# Patient Record
Sex: MALE | Race: WHITE | NOT HISPANIC OR LATINO | Employment: FULL TIME | URBAN - METROPOLITAN AREA
[De-identification: names, ages, dates, MRNs, and addresses within clinical notes are randomized per-mention and may not be internally consistent; named-entity substitution may affect disease eponyms.]

---

## 2020-08-04 ENCOUNTER — RECORDS - HEALTHEAST (OUTPATIENT)
Dept: LAB | Facility: CLINIC | Age: 30
End: 2020-08-04

## 2020-08-04 LAB
ALBUMIN SERPL-MCNC: 4.1 G/DL (ref 3.5–5)
ALP SERPL-CCNC: 98 U/L (ref 45–120)
ALT SERPL W P-5'-P-CCNC: 54 U/L (ref 0–45)
ANION GAP SERPL CALCULATED.3IONS-SCNC: 11 MMOL/L (ref 5–18)
AST SERPL W P-5'-P-CCNC: 24 U/L (ref 0–40)
BILIRUB SERPL-MCNC: 0.4 MG/DL (ref 0–1)
BUN SERPL-MCNC: 14 MG/DL (ref 8–22)
CALCIUM SERPL-MCNC: 9.7 MG/DL (ref 8.5–10.5)
CHLORIDE BLD-SCNC: 104 MMOL/L (ref 98–107)
CO2 SERPL-SCNC: 26 MMOL/L (ref 22–31)
CREAT SERPL-MCNC: 0.98 MG/DL (ref 0.7–1.3)
ERYTHROCYTE [SEDIMENTATION RATE] IN BLOOD BY WESTERGREN METHOD: 15 MM/HR (ref 0–15)
GFR SERPL CREATININE-BSD FRML MDRD: >60 ML/MIN/1.73M2
GLUCOSE BLD-MCNC: 122 MG/DL (ref 70–125)
IRON SATN MFR SERPL: 3 % (ref 20–50)
IRON SERPL-MCNC: 13 UG/DL (ref 42–175)
LIPASE SERPL-CCNC: 17 U/L (ref 0–52)
POTASSIUM BLD-SCNC: 4.4 MMOL/L (ref 3.5–5)
PROT SERPL-MCNC: 7.5 G/DL (ref 6–8)
SODIUM SERPL-SCNC: 141 MMOL/L (ref 136–145)
TIBC SERPL-MCNC: 463 UG/DL (ref 313–563)
TRANSFERRIN SERPL-MCNC: 370 MG/DL (ref 212–360)
VIT B12 SERPL-MCNC: 445 PG/ML (ref 213–816)

## 2021-05-28 ENCOUNTER — RECORDS - HEALTHEAST (OUTPATIENT)
Dept: ADMINISTRATIVE | Facility: CLINIC | Age: 31
End: 2021-05-28

## 2021-05-30 ENCOUNTER — RECORDS - HEALTHEAST (OUTPATIENT)
Dept: ADMINISTRATIVE | Facility: CLINIC | Age: 31
End: 2021-05-30

## 2022-01-16 ENCOUNTER — HOSPITAL ENCOUNTER (EMERGENCY)
Facility: CLINIC | Age: 32
Discharge: HOME | End: 2022-01-17
Attending: EMERGENCY MEDICINE | Admitting: EMERGENCY MEDICINE
Payer: COMMERCIAL

## 2022-01-16 ENCOUNTER — APPOINTMENT (OUTPATIENT)
Dept: GENERAL RADIOLOGY | Facility: CLINIC | Age: 32
End: 2022-01-16
Attending: EMERGENCY MEDICINE
Payer: COMMERCIAL

## 2022-01-16 DIAGNOSIS — K52.9 GASTROENTERITIS: ICD-10-CM

## 2022-01-16 LAB
ALBUMIN SERPL-MCNC: 3.5 G/DL (ref 3.4–5)
ALP SERPL-CCNC: 93 U/L (ref 40–150)
ALT SERPL W P-5'-P-CCNC: 45 U/L (ref 0–70)
ANION GAP SERPL CALCULATED.3IONS-SCNC: 7 MMOL/L (ref 3–14)
AST SERPL W P-5'-P-CCNC: 22 U/L (ref 0–45)
BASOPHILS # BLD AUTO: 0 10E3/UL (ref 0–0.2)
BASOPHILS NFR BLD AUTO: 0 %
BILIRUB SERPL-MCNC: 0.8 MG/DL (ref 0.2–1.3)
BUN SERPL-MCNC: 18 MG/DL (ref 7–30)
CALCIUM SERPL-MCNC: 9 MG/DL (ref 8.5–10.1)
CHLORIDE BLD-SCNC: 102 MMOL/L (ref 94–109)
CO2 SERPL-SCNC: 26 MMOL/L (ref 20–32)
CREAT SERPL-MCNC: 1.04 MG/DL (ref 0.66–1.25)
EOSINOPHIL # BLD AUTO: 0.1 10E3/UL (ref 0–0.7)
EOSINOPHIL NFR BLD AUTO: 1 %
ERYTHROCYTE [DISTWIDTH] IN BLOOD BY AUTOMATED COUNT: 14 % (ref 10–15)
GFR SERPL CREATININE-BSD FRML MDRD: >90 ML/MIN/1.73M2
GLUCOSE BLD-MCNC: 113 MG/DL (ref 70–99)
HCT VFR BLD AUTO: 43.9 % (ref 40–53)
HGB BLD-MCNC: 14.8 G/DL (ref 13.3–17.7)
HOLD SPECIMEN: NORMAL
HOLD SPECIMEN: NORMAL
IMM GRANULOCYTES # BLD: 0 10E3/UL
IMM GRANULOCYTES NFR BLD: 0 %
LACTATE SERPL-SCNC: 1.5 MMOL/L (ref 0.7–2)
LIPASE SERPL-CCNC: 39 U/L (ref 73–393)
LYMPHOCYTES # BLD AUTO: 1.5 10E3/UL (ref 0.8–5.3)
LYMPHOCYTES NFR BLD AUTO: 14 %
MCH RBC QN AUTO: 29.3 PG (ref 26.5–33)
MCHC RBC AUTO-ENTMCNC: 33.7 G/DL (ref 31.5–36.5)
MCV RBC AUTO: 87 FL (ref 78–100)
MONOCYTES # BLD AUTO: 0.5 10E3/UL (ref 0–1.3)
MONOCYTES NFR BLD AUTO: 5 %
NEUTROPHILS # BLD AUTO: 8.2 10E3/UL (ref 1.6–8.3)
NEUTROPHILS NFR BLD AUTO: 80 %
NRBC # BLD AUTO: 0 10E3/UL
NRBC BLD AUTO-RTO: 0 /100
PLATELET # BLD AUTO: 290 10E3/UL (ref 150–450)
POTASSIUM BLD-SCNC: 3.4 MMOL/L (ref 3.4–5.3)
PROT SERPL-MCNC: 7.3 G/DL (ref 6.8–8.8)
RBC # BLD AUTO: 5.05 10E6/UL (ref 4.4–5.9)
SODIUM SERPL-SCNC: 135 MMOL/L (ref 133–144)
WBC # BLD AUTO: 10.3 10E3/UL (ref 4–11)

## 2022-01-16 PROCEDURE — 82040 ASSAY OF SERUM ALBUMIN: CPT | Performed by: EMERGENCY MEDICINE

## 2022-01-16 PROCEDURE — 258N000003 HC RX IP 258 OP 636: Performed by: EMERGENCY MEDICINE

## 2022-01-16 PROCEDURE — 36415 COLL VENOUS BLD VENIPUNCTURE: CPT | Performed by: EMERGENCY MEDICINE

## 2022-01-16 PROCEDURE — 80053 COMPREHEN METABOLIC PANEL: CPT | Performed by: EMERGENCY MEDICINE

## 2022-01-16 PROCEDURE — 74019 RADEX ABDOMEN 2 VIEWS: CPT | Mod: 26 | Performed by: RADIOLOGY

## 2022-01-16 PROCEDURE — 250N000013 HC RX MED GY IP 250 OP 250 PS 637: Performed by: EMERGENCY MEDICINE

## 2022-01-16 PROCEDURE — 99284 EMERGENCY DEPT VISIT MOD MDM: CPT | Mod: 25 | Performed by: EMERGENCY MEDICINE

## 2022-01-16 PROCEDURE — 83690 ASSAY OF LIPASE: CPT | Performed by: EMERGENCY MEDICINE

## 2022-01-16 PROCEDURE — 96374 THER/PROPH/DIAG INJ IV PUSH: CPT | Performed by: EMERGENCY MEDICINE

## 2022-01-16 PROCEDURE — 74019 RADEX ABDOMEN 2 VIEWS: CPT

## 2022-01-16 PROCEDURE — 85025 COMPLETE CBC W/AUTO DIFF WBC: CPT | Performed by: EMERGENCY MEDICINE

## 2022-01-16 PROCEDURE — 83605 ASSAY OF LACTIC ACID: CPT | Performed by: EMERGENCY MEDICINE

## 2022-01-16 PROCEDURE — 250N000011 HC RX IP 250 OP 636: Performed by: EMERGENCY MEDICINE

## 2022-01-16 PROCEDURE — 96361 HYDRATE IV INFUSION ADD-ON: CPT | Performed by: EMERGENCY MEDICINE

## 2022-01-16 PROCEDURE — 99284 EMERGENCY DEPT VISIT MOD MDM: CPT | Performed by: EMERGENCY MEDICINE

## 2022-01-16 RX ORDER — ONDANSETRON 2 MG/ML
4 INJECTION INTRAMUSCULAR; INTRAVENOUS ONCE
Status: COMPLETED | OUTPATIENT
Start: 2022-01-16 | End: 2022-01-16

## 2022-01-16 RX ORDER — SODIUM CHLORIDE, SODIUM LACTATE, POTASSIUM CHLORIDE, CALCIUM CHLORIDE 600; 310; 30; 20 MG/100ML; MG/100ML; MG/100ML; MG/100ML
1000 INJECTION, SOLUTION INTRAVENOUS CONTINUOUS
Status: DISCONTINUED | OUTPATIENT
Start: 2022-01-16 | End: 2022-01-17 | Stop reason: HOSPADM

## 2022-01-16 RX ORDER — LOPERAMIDE HCL 2 MG
2 CAPSULE ORAL ONCE
Status: COMPLETED | OUTPATIENT
Start: 2022-01-16 | End: 2022-01-16

## 2022-01-16 RX ORDER — CETIRIZINE HYDROCHLORIDE 10 MG/1
10 TABLET ORAL DAILY
COMMUNITY

## 2022-01-16 RX ADMIN — SODIUM CHLORIDE, POTASSIUM CHLORIDE, SODIUM LACTATE AND CALCIUM CHLORIDE 1000 ML: 600; 310; 30; 20 INJECTION, SOLUTION INTRAVENOUS at 20:31

## 2022-01-16 RX ADMIN — LOPERAMIDE HYDROCHLORIDE 2 MG: 2 CAPSULE ORAL at 23:14

## 2022-01-16 RX ADMIN — SODIUM CHLORIDE, POTASSIUM CHLORIDE, SODIUM LACTATE AND CALCIUM CHLORIDE 1000 ML: 600; 310; 30; 20 INJECTION, SOLUTION INTRAVENOUS at 23:15

## 2022-01-16 RX ADMIN — ONDANSETRON 4 MG: 2 INJECTION INTRAMUSCULAR; INTRAVENOUS at 20:34

## 2022-01-16 ASSESSMENT — ENCOUNTER SYMPTOMS
VOMITING: 1
DIARRHEA: 1
BLOOD IN STOOL: 0
NAUSEA: 1
ABDOMINAL PAIN: 0

## 2022-01-16 ASSESSMENT — MIFFLIN-ST. JEOR: SCORE: 1836.25

## 2022-01-17 VITALS
SYSTOLIC BLOOD PRESSURE: 102 MMHG | TEMPERATURE: 98.8 F | HEART RATE: 107 BPM | BODY MASS INDEX: 27.77 KG/M2 | WEIGHT: 194 LBS | HEIGHT: 70 IN | DIASTOLIC BLOOD PRESSURE: 72 MMHG | OXYGEN SATURATION: 98 %

## 2022-01-17 RX ORDER — LOPERAMIDE HYDROCHLORIDE 2 MG/1
2 TABLET ORAL 4 TIMES DAILY PRN
Qty: 30 TABLET | Refills: 0 | Status: SHIPPED | OUTPATIENT
Start: 2022-01-17

## 2022-01-17 RX ORDER — ONDANSETRON 4 MG/1
4 TABLET, ORALLY DISINTEGRATING ORAL EVERY 8 HOURS PRN
Qty: 10 TABLET | Refills: 0 | Status: SHIPPED | OUTPATIENT
Start: 2022-01-17 | End: 2022-01-20

## 2022-01-17 NOTE — ED PROVIDER NOTES
"      [place] EMERGENCY DEPARTMENT ([facility])  1/16/22 ED11      History     Chief Complaint   Patient presents with     Nausea, Vomiting, & Diarrhea     The history is provided by the patient and medical records.     Amarjit Fry is a 31 year old male with a past medical history of ulcerative colitis, DVT, PE on Eliquis, s/p coloproctectomy with ileo J-pouch who presents to the ED for evaluation of nausea, vomiting, and diarrhea since 9 PM last night.  Patient states that at 9 PM he started to feel \"off\".  He states that he had diarrhea and threw up around 11-12 and then threw up 3-4 or more times till 4 AM. He was given Gatorade but that was thrown back up.  Today he said he had half a banana and half of Gatorade.  He has been trying to eat small amounts but says that his stomach tightens up and gets painful.  He endorses having \"hot flashes\", not sure if he is feverish.  Denies hematemesis or blood in stool.  He endorses having a feeling of pressure in the abdomen but no pain.  He is not sure if it feels more distended.  He states that he has burped and endorses \"liquidy, wispy, and green\" diarrhea.  He states that no one else at home has been sick.  He states his mother had the same lunch as he did.  Denies any food allergies or intolerances that he knows of.  States he is still taking his blood thinners and denies any other meds except Zyrtec.  His mother states that he has had a blockage after surgery before which caused his potassium to be low.  He states that he had 3 surgeries, 1 to remove his colon, the second to create a J-pouch, and the third to connect it.    Past Medical History  Past Medical History:   Diagnosis Date     DVT (deep venous thrombosis) (H)      Pulmonary emboli (H)      Past Surgical History:   Procedure Laterality Date     COLON SURGERY       COLOPROCTECTOMY W/ ILEO J POUCH       apixaban ANTICOAGULANT (ELIQUIS) 2.5 MG tablet  cetirizine (ZYRTEC) 10 MG tablet  loperamide " "(IMODIUM A-D) 2 MG tablet  ondansetron (ZOFRAN ODT) 4 MG ODT tab      No Known Allergies  Family History  No family history on file.  Social History   Social History     Tobacco Use     Smoking status: Never Smoker     Smokeless tobacco: Not on file   Substance Use Topics     Alcohol use: Not on file     Drug use: Not on file      Past medical history, past surgical history, medications, allergies, family history, and social history were reviewed with the patient. No additional pertinent items.       Review of Systems   Gastrointestinal: Positive for diarrhea, nausea and vomiting. Negative for abdominal pain and blood in stool.     A complete review of systems was performed with pertinent positives and negatives noted in the HPI, and all other systems negative.    Physical Exam   BP: 104/49  Pulse: 107  Temp: 98.8  F (37.1  C)  Height: 177.8 cm (5' 10\")  Weight: 88 kg (194 lb 0.1 oz)  SpO2: 95 %     Physical Exam  Gen:A&Ox3, no acute distress  HEENT:PERRL, no facial tenderness or wounds, head atraumatic, oropharynx clear, mucous membranes moist, TMs clear bilaterally  Neck:no bony tenderness or step offs, no JVD, trachea midline  Back: no CVA tenderness, no midline bony tenderness  CV:RRR without murmurs  PULM:Clear to auscultation bilaterally  Abd:soft, nontender, nondistended. Bowel sounds present and mildly hyperactive. Well healed abdominal incision and prior ostomy site.   UE:No traumatic injuries, skin normal  LE:no traumatic injuries, skin normal, no LE edema.   Neuro:CN II-XII intact, strength 5/5 throughout  Skin: no rashes or ecchymoses    ED Course     8:11 PM  The patient was seen and examined by Paige Norris MD in Room ED11.     Procedures       Results for orders placed or performed during the hospital encounter of 01/16/22   Abdomen XR, 2 vw, flat and upright     Status: None    Narrative    Abdomen 2 views    INDICATION: Nausea, vomiting, history of colectomy with J-pouch,  evaluate for small " bowel obstruction    COMPARISON: None    FINDINGS: Supine and upright views of the abdomen show nonobstructive  bowel gas pattern. No evidence of pneumatosis or free air. No  suspicious calcifications.      Impression    IMPRESSION: Nonobstructive bowel gas pattern.    YASIR CHAMBERS MD         SYSTEM ID:  [id number]   Comprehensive metabolic panel     Status: Abnormal   Result Value Ref Range    Sodium 135 133 - 144 mmol/L    Potassium 3.4 3.4 - 5.3 mmol/L    Chloride 102 94 - 109 mmol/L    Carbon Dioxide (CO2) 26 20 - 32 mmol/L    Anion Gap 7 3 - 14 mmol/L    Urea Nitrogen 18 7 - 30 mg/dL    Creatinine 1.04 0.66 - 1.25 mg/dL    Calcium 9.0 8.5 - 10.1 mg/dL    Glucose 113 (H) 70 - 99 mg/dL    Alkaline Phosphatase 93 40 - 150 U/L    AST 22 0 - 45 U/L    ALT 45 0 - 70 U/L    Protein Total 7.3 6.8 - 8.8 g/dL    Albumin 3.5 3.4 - 5.0 g/dL    Bilirubin Total 0.8 0.2 - 1.3 mg/dL    GFR Estimate >90 >60 mL/min/1.73m2   Lipase     Status: Abnormal   Result Value Ref Range    Lipase 39 (L) 73 - 393 U/L   Lactic acid whole blood     Status: Normal   Result Value Ref Range    Lactic Acid 1.5 0.7 - 2.0 mmol/L   CBC with platelets and differential     Status: None   Result Value Ref Range    WBC Count 10.3 4.0 - 11.0 10e3/uL    RBC Count 5.05 4.40 - 5.90 10e6/uL    Hemoglobin 14.8 13.3 - 17.7 g/dL    Hematocrit 43.9 40.0 - 53.0 %    MCV 87 78 - 100 fL    MCH 29.3 26.5 - 33.0 pg    MCHC 33.7 31.5 - 36.5 g/dL    RDW 14.0 10.0 - 15.0 %    Platelet Count 290 150 - 450 10e3/uL    % Neutrophils 80 %    % Lymphocytes 14 %    % Monocytes 5 %    % Eosinophils 1 %    % Basophils 0 %    % Immature Granulocytes 0 %    NRBCs per 100 WBC 0 <1 /100    Absolute Neutrophils 8.2 1.6 - 8.3 10e3/uL    Absolute Lymphocytes 1.5 0.8 - 5.3 10e3/uL    Absolute Monocytes 0.5 0.0 - 1.3 10e3/uL    Absolute Eosinophils 0.1 0.0 - 0.7 10e3/uL    Absolute Basophils 0.0 0.0 - 0.2 10e3/uL    Absolute Immature Granulocytes 0.0 <=0.4 10e3/uL    Absolute NRBCs  0.0 10e3/uL   Extra Blue Top Tube     Status: None   Result Value Ref Range    Hold Specimen JIC    Extra Red Top Tube     Status: None   Result Value Ref Range    Hold Specimen JIC    CBC with platelets differential     Status: None    Narrative    The following orders were created for panel order CBC with platelets differential.  Procedure                               Abnormality         Status                     ---------                               -----------         ------                     CBC with platelets and d...[[id number]]                      Final result                 Please view results for these tests on the individual orders.   Reading Draw     Status: None    Narrative    The following orders were created for panel order Reading Draw.  Procedure                               Abnormality         Status                     ---------                               -----------         ------                     Extra Blue Top Tube[[id number]]                              Final result               Extra Red Top Tube[[id number]]                               Final result                 Please view results for these tests on the individual orders.     Medications   lactated ringers infusion (0 mLs Intravenous Stopped 1/17/22 0040)   lactated ringers BOLUS 1,000 mL (0 mLs Intravenous Stopped 1/16/22 2241)   ondansetron (ZOFRAN) injection 4 mg (4 mg Intravenous Given 1/16/22 2034)   loperamide (IMODIUM) capsule 2 mg (2 mg Oral Given 1/16/22 2314)        Assessments & Plan (with Medical Decision Making)   32 yo M with a hx of ulcerative colitis with colectomy, J-pouch who presents with nausea, vomiting, and diarrhea.  Arrives afebrile, slightly tachycardic and hemodynamically stable.  IV access was obtained laboratory testing done.  CBC and CMP unremarkable.  Lipase normal.  Lactic acid 1.5.  Glucose 113.  And abdominal x-ray shows no signs of dilated loops of small bowel.  Clinically he is not  obstructed.   More concerning for acute gastroenteritis.  He was treated with IV Zofran and 2 L lactated Ringer's.  This improved his nausea and tolerated a p.o. challenge but p.o. intake did cause several episodes of diarrhea.  He was treated with oral loperamide and discharged home with loperamide and Zofran as needed.    I have reviewed the nursing notes. I have reviewed the findings, diagnosis, plan and need for follow up with the patient.    Discharge Medication List as of 1/17/2022 12:41 AM      START taking these medications    Details   loperamide (IMODIUM A-D) 2 MG tablet Take 1 tablet (2 mg) by mouth 4 times daily as needed for diarrhea, Disp-30 tablet, R-0, E-Prescribe      ondansetron (ZOFRAN ODT) 4 MG ODT tab Take 1 tablet (4 mg) by mouth every 8 hours as needed for nausea or vomiting, Disp-10 tablet, R-0, E-Prescribe             Final diagnoses:   Gastroenteritis     I, Audrey Butcher, am serving as a trained medical scribe to document services personally performed by Paige Norris MD based on the provider's statements to me on January 16, 2022.  This document has been checked and approved by the attending provider.    I, Paige Norris MD, was physically present and have reviewed and verified the accuracy of this note documented by Audrey Butcher medical scribe.      Paige Norris MD FACEP      --  Paige Norris MD  [facility] EMERGENCY DEPARTMENT  1/16/2022     Paige Norris MD  01/17/22 0221

## 2022-01-17 NOTE — ED TRIAGE NOTES
Pt accompanied by mother with c/o N/V and loose stools since last night.  Symptoms began after eating mac and cheese at a restaurant.  Having difficulty keeping meals down.  +Intermittent mid abdominal pain.  H/o colon surgery with J-pouch.

## 2022-01-17 NOTE — DISCHARGE INSTRUCTIONS
Thank you for coming to the [facility] Emergency Department.     Use zofran every 8 hours as needed for nausea or vomiting.   Take imodium 4mg (2 tablets) after your first loose stool of the day, then 2mg every 6 hours as needed.     Avoid dairy-containing foods until diarrhea has resolved.   It is OK to also try a fiber supplement to add mass to stools for the next few days.   Try to stay hydrated by taking small amounts of liquid throughout the day, rather than large amounts sporadically.     Return to the ER if you develop worsening abdominal pain, blood in stools or emesis, fever >100.4F, or worsening diarrhea and vomiting with concern for dehydration.

## 2022-03-12 ENCOUNTER — HEALTH MAINTENANCE LETTER (OUTPATIENT)
Age: 32
End: 2022-03-12

## 2022-08-30 ENCOUNTER — LAB REQUISITION (OUTPATIENT)
Dept: LAB | Facility: CLINIC | Age: 32
End: 2022-08-30
Payer: COMMERCIAL

## 2022-08-30 DIAGNOSIS — R10.30 LOWER ABDOMINAL PAIN, UNSPECIFIED: ICD-10-CM

## 2022-08-30 PROCEDURE — 80053 COMPREHEN METABOLIC PANEL: CPT | Mod: ORL | Performed by: PHYSICIAN ASSISTANT

## 2022-09-01 LAB
ALBUMIN SERPL BCG-MCNC: 4.7 G/DL (ref 3.5–5.2)
ALP SERPL-CCNC: 110 U/L (ref 40–129)
ALT SERPL W P-5'-P-CCNC: 55 U/L (ref 10–50)
ANION GAP SERPL CALCULATED.3IONS-SCNC: 14 MMOL/L (ref 7–15)
AST SERPL W P-5'-P-CCNC: 36 U/L (ref 10–50)
BILIRUB SERPL-MCNC: 0.5 MG/DL
BUN SERPL-MCNC: 12.8 MG/DL (ref 6–20)
CALCIUM SERPL-MCNC: 9.9 MG/DL (ref 8.6–10)
CHLORIDE SERPL-SCNC: 99 MMOL/L (ref 98–107)
CREAT SERPL-MCNC: 0.97 MG/DL (ref 0.67–1.17)
DEPRECATED HCO3 PLAS-SCNC: 22 MMOL/L (ref 22–29)
GFR SERPL CREATININE-BSD FRML MDRD: >90 ML/MIN/1.73M2
GLUCOSE SERPL-MCNC: 91 MG/DL (ref 70–99)
POTASSIUM SERPL-SCNC: 5 MMOL/L (ref 3.4–5.3)
PROT SERPL-MCNC: 7.2 G/DL (ref 6.4–8.3)
SODIUM SERPL-SCNC: 135 MMOL/L (ref 136–145)

## 2022-09-19 ENCOUNTER — HOSPITAL ENCOUNTER (EMERGENCY)
Facility: CLINIC | Age: 32
Discharge: HOME | End: 2022-09-19
Attending: FAMILY MEDICINE | Admitting: FAMILY MEDICINE
Payer: COMMERCIAL

## 2022-09-19 ENCOUNTER — APPOINTMENT (OUTPATIENT)
Dept: CT IMAGING | Facility: CLINIC | Age: 32
End: 2022-09-19
Attending: FAMILY MEDICINE
Payer: COMMERCIAL

## 2022-09-19 VITALS
SYSTOLIC BLOOD PRESSURE: 128 MMHG | DIASTOLIC BLOOD PRESSURE: 82 MMHG | OXYGEN SATURATION: 100 % | TEMPERATURE: 98.3 F | HEART RATE: 79 BPM

## 2022-09-19 DIAGNOSIS — K55.069 OMENTAL INFARCTION (H): ICD-10-CM

## 2022-09-19 DIAGNOSIS — Z85.038 HISTORY OF COLON CANCER: ICD-10-CM

## 2022-09-19 DIAGNOSIS — Z79.01 LONG TERM CURRENT USE OF ANTICOAGULANT THERAPY: ICD-10-CM

## 2022-09-19 DIAGNOSIS — R10.84 ABDOMINAL PAIN, GENERALIZED: ICD-10-CM

## 2022-09-19 LAB
ALBUMIN SERPL BCG-MCNC: 4.8 G/DL (ref 3.5–5.2)
ALBUMIN UR-MCNC: NEGATIVE MG/DL
ALP SERPL-CCNC: 104 U/L (ref 40–129)
ALT SERPL W P-5'-P-CCNC: 45 U/L (ref 10–50)
ANION GAP SERPL CALCULATED.3IONS-SCNC: 10 MMOL/L (ref 7–15)
APPEARANCE UR: CLEAR
AST SERPL W P-5'-P-CCNC: 27 U/L (ref 10–50)
BASOPHILS # BLD AUTO: 0.1 10E3/UL (ref 0–0.2)
BASOPHILS NFR BLD AUTO: 1 %
BILIRUB SERPL-MCNC: 0.5 MG/DL
BILIRUB UR QL STRIP: NEGATIVE
BUN SERPL-MCNC: 10.8 MG/DL (ref 6–20)
CALCIUM SERPL-MCNC: 9.7 MG/DL (ref 8.6–10)
CHLORIDE SERPL-SCNC: 102 MMOL/L (ref 98–107)
COLOR UR AUTO: ABNORMAL
CREAT SERPL-MCNC: 0.9 MG/DL (ref 0.67–1.17)
CRP SERPL-MCNC: 4.49 MG/L
DEPRECATED HCO3 PLAS-SCNC: 27 MMOL/L (ref 22–29)
EOSINOPHIL # BLD AUTO: 0.2 10E3/UL (ref 0–0.7)
EOSINOPHIL NFR BLD AUTO: 2 %
ERYTHROCYTE [DISTWIDTH] IN BLOOD BY AUTOMATED COUNT: 13.2 % (ref 10–15)
GFR SERPL CREATININE-BSD FRML MDRD: >90 ML/MIN/1.73M2
GLUCOSE SERPL-MCNC: 89 MG/DL (ref 70–99)
GLUCOSE UR STRIP-MCNC: NEGATIVE MG/DL
HCT VFR BLD AUTO: 46.5 % (ref 40–53)
HGB BLD-MCNC: 15.8 G/DL (ref 13.3–17.7)
HGB UR QL STRIP: NEGATIVE
HOLD SPECIMEN: NORMAL
IMM GRANULOCYTES # BLD: 0 10E3/UL
IMM GRANULOCYTES NFR BLD: 0 %
INR PPP: 0.93 (ref 0.85–1.15)
KETONES UR STRIP-MCNC: NEGATIVE MG/DL
LEUKOCYTE ESTERASE UR QL STRIP: NEGATIVE
LIPASE SERPL-CCNC: 22 U/L (ref 13–60)
LYMPHOCYTES # BLD AUTO: 2.4 10E3/UL (ref 0.8–5.3)
LYMPHOCYTES NFR BLD AUTO: 27 %
MCH RBC QN AUTO: 30.3 PG (ref 26.5–33)
MCHC RBC AUTO-ENTMCNC: 34 G/DL (ref 31.5–36.5)
MCV RBC AUTO: 89 FL (ref 78–100)
MONOCYTES # BLD AUTO: 0.5 10E3/UL (ref 0–1.3)
MONOCYTES NFR BLD AUTO: 6 %
MUCOUS THREADS #/AREA URNS LPF: PRESENT /LPF
NEUTROPHILS # BLD AUTO: 5.5 10E3/UL (ref 1.6–8.3)
NEUTROPHILS NFR BLD AUTO: 64 %
NITRATE UR QL: NEGATIVE
NRBC # BLD AUTO: 0 10E3/UL
NRBC BLD AUTO-RTO: 0 /100
PH UR STRIP: 5 [PH] (ref 5–7)
PLATELET # BLD AUTO: 316 10E3/UL (ref 150–450)
POTASSIUM SERPL-SCNC: 4.6 MMOL/L (ref 3.4–5.3)
PROT SERPL-MCNC: 7.8 G/DL (ref 6.4–8.3)
RBC # BLD AUTO: 5.22 10E6/UL (ref 4.4–5.9)
RBC URINE: 0 /HPF
SODIUM SERPL-SCNC: 139 MMOL/L (ref 136–145)
SP GR UR STRIP: 1.02 (ref 1–1.03)
UROBILINOGEN UR STRIP-MCNC: NORMAL MG/DL
WBC # BLD AUTO: 8.7 10E3/UL (ref 4–11)
WBC URINE: <1 /HPF

## 2022-09-19 PROCEDURE — 74177 CT ABD & PELVIS W/CONTRAST: CPT

## 2022-09-19 PROCEDURE — 74177 CT ABD & PELVIS W/CONTRAST: CPT | Mod: 26 | Performed by: RADIOLOGY

## 2022-09-19 PROCEDURE — 86140 C-REACTIVE PROTEIN: CPT | Performed by: FAMILY MEDICINE

## 2022-09-19 PROCEDURE — 250N000011 HC RX IP 250 OP 636: Performed by: STUDENT IN AN ORGANIZED HEALTH CARE EDUCATION/TRAINING PROGRAM

## 2022-09-19 PROCEDURE — 99207 PR NON-BILLABLE SERV PER CHARTING: CPT | Performed by: SURGERY

## 2022-09-19 PROCEDURE — 80053 COMPREHEN METABOLIC PANEL: CPT | Performed by: FAMILY MEDICINE

## 2022-09-19 PROCEDURE — 81003 URINALYSIS AUTO W/O SCOPE: CPT | Performed by: FAMILY MEDICINE

## 2022-09-19 PROCEDURE — 85004 AUTOMATED DIFF WBC COUNT: CPT | Performed by: FAMILY MEDICINE

## 2022-09-19 PROCEDURE — 258N000003 HC RX IP 258 OP 636: Performed by: FAMILY MEDICINE

## 2022-09-19 PROCEDURE — 99285 EMERGENCY DEPT VISIT HI MDM: CPT | Mod: 25

## 2022-09-19 PROCEDURE — 96360 HYDRATION IV INFUSION INIT: CPT

## 2022-09-19 PROCEDURE — 83690 ASSAY OF LIPASE: CPT | Performed by: FAMILY MEDICINE

## 2022-09-19 PROCEDURE — 36415 COLL VENOUS BLD VENIPUNCTURE: CPT | Performed by: FAMILY MEDICINE

## 2022-09-19 PROCEDURE — 250N000013 HC RX MED GY IP 250 OP 250 PS 637: Performed by: FAMILY MEDICINE

## 2022-09-19 PROCEDURE — 85610 PROTHROMBIN TIME: CPT | Performed by: FAMILY MEDICINE

## 2022-09-19 PROCEDURE — 99285 EMERGENCY DEPT VISIT HI MDM: CPT | Performed by: FAMILY MEDICINE

## 2022-09-19 RX ORDER — IOPAMIDOL 755 MG/ML
119 INJECTION, SOLUTION INTRAVASCULAR ONCE
Status: COMPLETED | OUTPATIENT
Start: 2022-09-19 | End: 2022-09-19

## 2022-09-19 RX ORDER — ACETAMINOPHEN 500 MG
1000 TABLET ORAL ONCE
Status: COMPLETED | OUTPATIENT
Start: 2022-09-19 | End: 2022-09-19

## 2022-09-19 RX ORDER — LIDOCAINE 40 MG/G
CREAM TOPICAL
Status: DISCONTINUED | OUTPATIENT
Start: 2022-09-19 | End: 2022-09-19 | Stop reason: HOSPADM

## 2022-09-19 RX ADMIN — IOPAMIDOL 119 ML: 755 INJECTION, SOLUTION INTRAVENOUS at 13:55

## 2022-09-19 RX ADMIN — ACETAMINOPHEN 1000 MG: 500 TABLET ORAL at 16:36

## 2022-09-19 RX ADMIN — SODIUM CHLORIDE 1000 ML: 9 INJECTION, SOLUTION INTRAVENOUS at 10:31

## 2022-09-19 ASSESSMENT — ACTIVITIES OF DAILY LIVING (ADL)
ADLS_ACUITY_SCORE: 33
ADLS_ACUITY_SCORE: 35

## 2022-09-19 ASSESSMENT — ENCOUNTER SYMPTOMS
ACTIVITY CHANGE: 1
SHORTNESS OF BREATH: 0
LIGHT-HEADEDNESS: 0
CONSTIPATION: 0
FREQUENCY: 0
NAUSEA: 0
SORE THROAT: 0
DIARRHEA: 0
VOMITING: 0
HEADACHES: 0
NUMBNESS: 0
DECREASED CONCENTRATION: 0
WEAKNESS: 0
HEMATURIA: 0
BLOOD IN STOOL: 0
TROUBLE SWALLOWING: 0
JOINT SWELLING: 0
ABDOMINAL PAIN: 1
DYSURIA: 0
FLANK PAIN: 1
VOICE CHANGE: 0
DYSPHORIC MOOD: 0
APPETITE CHANGE: 0
BRUISES/BLEEDS EASILY: 1
FATIGUE: 0
WOUND: 0
FEVER: 0

## 2022-09-19 NOTE — CONSULTS
EGS Surgery Consult  2022    Amarjit Fry  : 1990    Date of Service: 2022 4:53 PM    Assessment and Plan:  Amarjit Fry is a 32 year old male with a history of colon cancer s/p total colectomy and J-pouch formation in  as well as a history of PE at age 16 and DVT 5 years ago with unknown cause. He presented today with 3wks of unresolved abdominal pain with a CT today showing omental infarction versus epiploic appendagitis and enlarging mesorectal and superior rectal lymph nodes concerning for malignancy.    - No surgical intervention needed for omental infarct.   - Enlarged lymph nodes concerning for malignancy should be followed up outpatient with our colorectal surgery team or with the patient's team at [facility]. Consider PET/CT or short term follow up CT in 3-6 months.    Discussed with Chief resident Levon Box MD  PGY-1 Surgery Resident      History of Present Illness:    Amarjit Fry is a 32 year old male with a history of colon cancer s/p total colectomy and J-pouch formation in  as well as a history of PE at age 16 and DVT 5 years ago with unknown cause. He presented today with 3wks of unresolved abdominal pain. After about one week of ignoring the 3/10 abdominal pain, the patient went to his PCP where a non contrast CT was done, which was read as epiploic appendagitis, and the patient was prescribed 2wks of ibuprophen. This did lead to resolution of his symptoms, but when he completed the medication his pain returned. At 3am this morning the patient awoke to use the restroom and noticed his pain had increased to 7/10, he was unable to go back to sleep, prompting him to present to the ED. Since arriving to the ED, his patient has returned to ~3/10.  He has noticed no other symptoms; he denied fevers, nausea/vomiting, diarrhea, constipation, decrease in flatus, bloody/black stools, cp, or sob.    Past Medical History:  Past Medical History:    Diagnosis Date     DVT (deep venous thrombosis) (H)      Pulmonary emboli (H)        Past Surgical History  Past Surgical History:   Procedure Laterality Date     COLON SURGERY       COLOPROCTECTOMY W/ ILEO J POUCH         Family History:  No family history on file.    Social History:  Social History     Socioeconomic History     Marital status: Single     Spouse name: Not on file     Number of children: Not on file     Years of education: Not on file     Highest education level: Not on file   Occupational History     Not on file   Tobacco Use     Smoking status: Never Smoker     Smokeless tobacco: Not on file   Substance and Sexual Activity     Alcohol use: Not on file     Drug use: Not on file     Sexual activity: Not on file   Other Topics Concern     Not on file   Social History Narrative     Not on file     Social Determinants of Health     Financial Resource Strain: Not on file   Food Insecurity: Not on file   Transportation Needs: Not on file   Physical Activity: Not on file   Stress: Not on file   Social Connections: Not on file   Intimate Partner Violence: Not on file   Housing Stability: Not on file       Medications:  Current Outpatient Medications   Medication Sig Dispense Refill     apixaban ANTICOAGULANT (ELIQUIS) 2.5 MG tablet Take 2.5 mg by mouth 2 times daily       cetirizine (ZYRTEC) 10 MG tablet Take 10 mg by mouth daily       loperamide (IMODIUM A-D) 2 MG tablet Take 1 tablet (2 mg) by mouth 4 times daily as needed for diarrhea 30 tablet 0       Allergies:   No Known Allergies    Review of Symptoms:  A 10 point review of symptoms has been conducted and is negative except for that mentioned in the above HPI.    Physical Exam:    Blood pressure (!) 127/92, pulse 84, temperature 98.3  F (36.8  C), temperature source Oral, SpO2 100 %.  Gen:    Lying in bed in NAD, A&OX3  HEENT: Normocephalic and atraumatic  CV:  RRR  Pulm:  Non-labored breathing  Abd:  Soft, non-distended, slightly tender to  palpation in RLQ. Midline scar and old RLQ ileostomy scars visable and well healed.  Ext:  Warm and well perfused, no obvious deformities    Labs:  CBC RESULTS:   Recent Labs   Lab Test 09/19/22  1026   WBC 8.7   RBC 5.22   HGB 15.8   HCT 46.5   MCV 89   MCH 30.3   MCHC 34.0   RDW 13.2        Last Basic Metabolic Panel:  Lab Results   Component Value Date     09/19/2022      Lab Results   Component Value Date    POTASSIUM 4.6 09/19/2022    POTASSIUM 3.4 01/16/2022     Lab Results   Component Value Date    CHLORIDE 102 09/19/2022    CHLORIDE 102 01/16/2022     Lab Results   Component Value Date    HAWK 9.7 09/19/2022     Lab Results   Component Value Date    CO2 27 09/19/2022    CO2 26 01/16/2022     Lab Results   Component Value Date    BUN 10.8 09/19/2022    BUN 18 01/16/2022     Lab Results   Component Value Date    CR 0.90 09/19/2022     Lab Results   Component Value Date    GLC 89 09/19/2022     01/16/2022       Imaging:  CT Abdomen Pelvis w Contrast    Result Date: 9/19/2022  EXAMINATION: CT ABDOMEN PELVIS W CONTRAST, 9/19/2022 2:04 PM INDICATION: rlq ongoing with epiploicappendigitis past CT, hx of total colectomy also COMPARISON STUDY: CT Chest 5/31/2021 TECHNIQUE: CT scan of the abdomen and pelvis was performed on multidetector CT scanner using volumetric acquisition technique and images were reconstructed in multiple planes with variable thickness and reviewed on dedicated workstations. CONTRAST: iopamidol (ISOVUE-370) solution 119 mL injected IV with oral contrast CT scan radiation dose is optimized to minimum requisite dose using automated dose modulation techniques. FINDINGS: Lower thorax: Normal. Liver: Smooth liver surface. Mild hepatic steatosis. A 2.1 cm hyperattenuating lesion matching with with blood pool corresponding to the known hepatic segment 7 hemangioma . No intrahepatic biliary ductal dilation. Biliary System: Normal gallbladder. No extrahepatic biliary ductal dilation.  Pancreas: No mass or pancreatic ductal dilation. Adrenal glands: No mass or nodules Spleen: Normal. Kidneys: No suspicious mass, obstructing calculus or hydronephrosis. Gastrointestinal tract :Normal caliber small bowel. Surgical changes of total proctocolectomy with ileal J pouch anal anastomosis. No definite mass at anastomotic site. Mesentery/peritoneum/retroperitoneum: Approximately 4.1 cm omental fat with rim enhancement and surrounding fat stranding located within left upper quadrant (series 3:53). No free fluid or air. Lymph nodes: Several enlarged mesorectal lymph nodes measuring up to 9 mm in short axis (series 3:139), superior rectal lymph nodes measuring up to 1 cm (4/343), multiple superior mesenteric lymph nodes measuring up to 8 mm. Vasculature: Patent major abdominal vasculature. Pelvis: Urinary bladder is normal.  Prostate and seminal vesicles are within normal limits. Osseous structures: No aggressive or acute osseous lesion.   Soft tissues: Post surgical changes along the anterior abdominal wall.  Fat containing left inguinal hernia.     IMPRESSION: 1. Approximately 4.1 cm omental fat with rim enhancement and surrounding fat stranding located within left upper quadrant is consistent with omental infarction versus epiploic appendagitis, although the latter is less likely given history of total colectomy.  2. Enlarged mesorectal, superior rectal and mesenteric lymph nodes measuring up to 9 mm in short axis, are suspicious given history of malignancy. Consider PET/CT or short term follow up CT in 3-6 months. 3. Surgical changes of total proctocolectomy with ileal J pouch anal anastomosis. No definite mass at anastomotic site. 4. Additional findings as described above. I have personally reviewed the examination and initial interpretation and I agree with the findings. DERRELL SHEPARD MD   SYSTEM ID:  [id number]

## 2022-09-19 NOTE — ED TRIAGE NOTES
C/o abdominal pain, unable to sleep d/t pain since 3am  Recent dx of appendagitis, has been taking ibuprofen, was told to come to ER if pain worsened      Denies n/v/d  Takes xarelto

## 2022-09-19 NOTE — DISCHARGE INSTRUCTIONS
Home.  Your labs are stable.  You were seen by surgery who reviewed your CT scan with recommendation to take ibuprofen and tylenol for pain as needed.  Symptoms should improve.  Follow up with PMD this Wednesday as planned.  Follow up with [place] regarding the CT findings of lymph nodes and further recommendations.  REturn if any concerns at all.    Results for orders placed or performed during the hospital encounter of 09/19/22   CT Abdomen Pelvis w Contrast     Status: None    Narrative    EXAMINATION: CT ABDOMEN PELVIS W CONTRAST, 9/19/2022 2:04 PM    INDICATION: rlq ongoing with epiploicappendigitis past CT, hx of total  colectomy also    COMPARISON STUDY: CT Chest 5/31/2021    TECHNIQUE: CT scan of the abdomen and pelvis was performed on  multidetector CT scanner using volumetric acquisition technique and  images were reconstructed in multiple planes with variable thickness  and reviewed on dedicated workstations.     CONTRAST: iopamidol (ISOVUE-370) solution 119 mL injected IV with oral  contrast    CT scan radiation dose is optimized to minimum requisite dose using  automated dose modulation techniques.    FINDINGS:    Lower thorax: Normal.    Liver: Smooth liver surface. Mild hepatic steatosis. A 2.1 cm  hyperattenuating lesion matching with with blood pool corresponding to  the known hepatic segment 7 hemangioma . No intrahepatic biliary  ductal dilation.    Biliary System: Normal gallbladder. No extrahepatic biliary ductal  dilation.    Pancreas: No mass or pancreatic ductal dilation.    Adrenal glands: No mass or nodules    Spleen: Normal.    Kidneys: No suspicious mass, obstructing calculus or hydronephrosis.    Gastrointestinal tract :Normal caliber small bowel. Surgical changes  of total proctocolectomy with ileal J pouch anal anastomosis. No  definite mass at anastomotic site.    Mesentery/peritoneum/retroperitoneum: Approximately 4.1 cm omental fat  with rim enhancement and surrounding fat stranding  located within left  upper quadrant (series 3:53). No free fluid or air.    Lymph nodes: Several enlarged mesorectal lymph nodes measuring up to 9  mm in short axis (series 3:139), superior rectal lymph nodes measuring  up to 1 cm (4/343), multiple superior mesenteric lymph nodes measuring  up to 8 mm.     Vasculature: Patent major abdominal vasculature.    Pelvis: Urinary bladder is normal.  Prostate and seminal vesicles are  within normal limits.    Osseous structures: No aggressive or acute osseous lesion.      Soft tissues: Post surgical changes along the anterior abdominal wall.   Fat containing left inguinal hernia.      Impression    IMPRESSION:  1. Approximately 4.1 cm omental fat with rim enhancement and  surrounding fat stranding located within left upper quadrant is  consistent with omental infarction versus epiploic appendagitis,  although the latter is less likely given history of total colectomy.    2. Enlarged mesorectal, superior rectal and mesenteric lymph nodes  measuring up to 9 mm in short axis, are suspicious given history of  malignancy. Consider PET/CT or short term follow up CT in 3-6 months.  3. Surgical changes of total proctocolectomy with ileal J pouch anal  anastomosis. No definite mass at anastomotic site.  4. Additional findings as described above.    I have personally reviewed the examination and initial interpretation  and I agree with the findings.    DERRELL SHEPARD MD         SYSTEM ID:  [id number]   [place] Draw     Status: None    Narrative    The following orders were created for panel order [place] Draw.  Procedure                               Abnormality         Status                     ---------                               -----------         ------                     Extra Blue Top Tube[[id number]]                              Final result               Extra Red Top Tube[[id number]]                               Final result               Extra Green Top  (Lithium...[[id number]]                      Final result               Extra Purple Top Tube[[id number]]                            Final result                 Please view results for these tests on the individual orders.   Extra Blue Top Tube     Status: None   Result Value Ref Range    Hold Specimen JIC    Extra Red Top Tube     Status: None   Result Value Ref Range    Hold Specimen JIC    Extra Green Top (Lithium Heparin) Tube     Status: None   Result Value Ref Range    Hold Specimen JIC    Extra Purple Top Tube     Status: None   Result Value Ref Range    Hold Specimen JIC    CRP inflammation     Status: Normal   Result Value Ref Range    CRP Inflammation 4.49 <5.00 mg/L   INR     Status: Normal   Result Value Ref Range    INR 0.93 0.85 - 1.15   Comprehensive metabolic panel     Status: Normal   Result Value Ref Range    Sodium 139 136 - 145 mmol/L    Potassium 4.6 3.4 - 5.3 mmol/L    Chloride 102 98 - 107 mmol/L    Carbon Dioxide (CO2) 27 22 - 29 mmol/L    Anion Gap 10 7 - 15 mmol/L    Urea Nitrogen 10.8 6.0 - 20.0 mg/dL    Creatinine 0.90 0.67 - 1.17 mg/dL    Calcium 9.7 8.6 - 10.0 mg/dL    Glucose 89 70 - 99 mg/dL    Alkaline Phosphatase 104 40 - 129 U/L    AST 27 10 - 50 U/L    ALT 45 10 - 50 U/L    Protein Total 7.8 6.4 - 8.3 g/dL    Albumin 4.8 3.5 - 5.2 g/dL    Bilirubin Total 0.5 <=1.2 mg/dL    GFR Estimate >90 >60 mL/min/1.73m2   Lipase     Status: Normal   Result Value Ref Range    Lipase 22 13 - 60 U/L   UA with Microscopic reflex to Culture     Status: Abnormal    Specimen: Urine, Clean Catch   Result Value Ref Range    Color Urine Light Yellow Colorless, Straw, Light Yellow, Yellow    Appearance Urine Clear Clear    Glucose Urine Negative Negative mg/dL    Bilirubin Urine Negative Negative    Ketones Urine Negative Negative mg/dL    Specific Gravity Urine 1.016 1.003 - 1.035    Blood Urine Negative Negative    pH Urine 5.0 5.0 - 7.0    Protein Albumin Urine Negative Negative mg/dL    Urobilinogen  Urine Normal Normal, 2.0 mg/dL    Nitrite Urine Negative Negative    Leukocyte Esterase Urine Negative Negative    Mucus Urine Present (A) None Seen /LPF    RBC Urine 0 <=2 /HPF    WBC Urine <1 <=5 /HPF    Narrative    Urine Culture not indicated   CBC with platelets and differential     Status: None   Result Value Ref Range    WBC Count 8.7 4.0 - 11.0 10e3/uL    RBC Count 5.22 4.40 - 5.90 10e6/uL    Hemoglobin 15.8 13.3 - 17.7 g/dL    Hematocrit 46.5 40.0 - 53.0 %    MCV 89 78 - 100 fL    MCH 30.3 26.5 - 33.0 pg    MCHC 34.0 31.5 - 36.5 g/dL    RDW 13.2 10.0 - 15.0 %    Platelet Count 316 150 - 450 10e3/uL    % Neutrophils 64 %    % Lymphocytes 27 %    % Monocytes 6 %    % Eosinophils 2 %    % Basophils 1 %    % Immature Granulocytes 0 %    NRBCs per 100 WBC 0 <1 /100    Absolute Neutrophils 5.5 1.6 - 8.3 10e3/uL    Absolute Lymphocytes 2.4 0.8 - 5.3 10e3/uL    Absolute Monocytes 0.5 0.0 - 1.3 10e3/uL    Absolute Eosinophils 0.2 0.0 - 0.7 10e3/uL    Absolute Basophils 0.1 0.0 - 0.2 10e3/uL    Absolute Immature Granulocytes 0.0 <=0.4 10e3/uL    Absolute NRBCs 0.0 10e3/uL   CBC with platelets differential     Status: None    Narrative    The following orders were created for panel order CBC with platelets differential.  Procedure                               Abnormality         Status                     ---------                               -----------         ------                     CBC with platelets and d...[[id number]]                      Final result                 Please view results for these tests on the individual orders.

## 2022-09-19 NOTE — ED PROVIDER NOTES
ED Provider Note  [facility]      History     Chief Complaint   Patient presents with     Abdominal Pain     HPI  Amarjit Fry is a 32 year old male who presents emergency room with abdominal pain.  Patient noted history of colon cancer with total colectomy with J-pouch now revision in the past.  He is believe cancer free and not undergoing any type of treatment currently.    Noted 3 weeks ago developing some right lower quadrant pain persisted he had a CT scan 2 weeks ago he received the result a week ago which was diagnosed with epiploic appendagitis.  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  Patient has had a GI bleeding he has been taking ibuprofen for the last week he notes pain is still there in the right lower quadrant.  He then today noted increasing pain throughout even into the back but no dysuric symptoms no nausea vomiting.  No reports of blood in the stool otherwise with this.  No increasing diarrheal type symptoms.  No fevers or chills the pain is settled down now presents for evaluation.  Patient notes that he still has the pain in the right lower quadrant which has been persistent for the last 3 weeks.  It has not really improved.    Past Medical History  Past Medical History:   Diagnosis Date     DVT (deep venous thrombosis) (H)      Pulmonary emboli (H)      Past Surgical History:   Procedure Laterality Date     COLON SURGERY       COLOPROCTECTOMY W/ ILEO J POUCH       apixaban ANTICOAGULANT (ELIQUIS) 2.5 MG tablet  cetirizine (ZYRTEC) 10 MG tablet  loperamide (IMODIUM A-D) 2 MG tablet      No Known Allergies  Family History  No family history on file.  Social History   Social History     Tobacco Use     Smoking status: Never Smoker      Past medical history, past surgical history, medications, allergies, family history, and social history were reviewed with the patient. No additional pertinent items.       Review of Systems   Constitutional:  Positive for activity change. Negative for appetite change, fatigue and fever.   HENT: Negative for sore throat, trouble swallowing and voice change.    Eyes: Negative for visual disturbance.   Respiratory: Negative for shortness of breath.    Cardiovascular: Negative for chest pain.   Gastrointestinal: Positive for abdominal pain. Negative for blood in stool, constipation, diarrhea, nausea and vomiting.        Patient had diffuse abdominal pain now is resolved now still persistent right lower quadrant that has had for 3 weeks.   Genitourinary: Positive for flank pain (resolved). Negative for dysuria, frequency and hematuria.   Musculoskeletal: Negative for gait problem and joint swelling.   Skin: Negative for rash and wound.   Allergic/Immunologic: Negative for immunocompromised state.   Neurological: Negative for weakness, light-headedness, numbness and headaches.   Hematological: Bruises/bleeds easily (on eliquis).   Psychiatric/Behavioral: Negative for decreased concentration and dysphoric mood.   All other systems reviewed and are negative.    A complete review of systems was performed with pertinent positives and negatives noted in the HPI, and all other systems negative.    Physical Exam   BP: 111/71  Pulse: 83  Temp: 98.2  F (36.8  C)  SpO2: 98 %  Physical Exam  Vitals and nursing note reviewed.   Constitutional:       General: He is not in acute distress.     Appearance: He is well-developed. He is not diaphoretic.   HENT:      Head: Normocephalic and atraumatic.      Nose: Nose normal.      Mouth/Throat:      Mouth: Mucous membranes are dry.      Pharynx: Oropharynx is clear.   Eyes:      General: No scleral icterus.  Cardiovascular:      Rate and Rhythm: Normal rate and regular rhythm.   Pulmonary:      Effort: No respiratory distress.      Breath sounds: No stridor.   Abdominal:      General: There is no distension.      Palpations: Abdomen is soft.      Tenderness: There is abdominal tenderness.       Comments: Abdomen is nonrigid there is some minimal tenderness right lower quadrant no rebound or guarding no rash no mass   Musculoskeletal:         General: No swelling or tenderness.      Cervical back: Normal range of motion and neck supple. No rigidity.   Skin:     General: Skin is warm and dry.      Capillary Refill: Capillary refill takes less than 2 seconds.      Coloration: Skin is not jaundiced or pale.      Findings: No rash.   Neurological:      Mental Status: He is alert and oriented to person, place, and time.   Psychiatric:      Comments: Very appropriate here in the ER.  Mother present also         ED Course         Patient valuated triage initially I did order an IV with labs IV fluids to be ordered discussed with surgery as recommendations for possible repeat CT scan of the abdomen without contrast 2 weeks ago with ongoing 3 weeks of right lower quadrant pain in the setting of epiploic appendagitis with his history will see if we need to possibly do a repeat with contrast this time.    Patient had labs drawn IV established liter normal saline given.  Laboratory tests reveal lipase 22 CRP of 4.49.  White count 8.7 hemoglobin 15.8.  Sodium 139 potassium 4.6.  Bicarb 27 creatinine is 0.90.  Urinalysis negative for any acute infection otherwise.    Discussed with surgery also with history we will do a CT scan with contrast patient present plan.  CT scan was done findings noted below with questionable omental infarct less likely epiploic appendagitis.  Also some small nodes seen in the perirectal area etc.  Patient seen by surgery here in the ER.  At this point valuated patient reviewing CT feel no acute surgical intervention needed at this point.  Recommended follow-up with his male physicians regarding the lymph nodes etc. and further evaluation with this also.  In the meantime patient still can take some ibuprofen as needed along with Tylenol and following up with MD and return if any worsening  symptoms or concerns reevaluated the patient patient here with mother agree with plan at this point patient comfortably discharged feeling better except he is hungry.    Procedures                     Results for orders placed or performed during the hospital encounter of 09/19/22   CT Abdomen Pelvis w Contrast     Status: None    Narrative    EXAMINATION: CT ABDOMEN PELVIS W CONTRAST, 9/19/2022 2:04 PM    INDICATION: rlq ongoing with epiploicappendigitis past CT, hx of total  colectomy also    COMPARISON STUDY: CT Chest 5/31/2021    TECHNIQUE: CT scan of the abdomen and pelvis was performed on  multidetector CT scanner using volumetric acquisition technique and  images were reconstructed in multiple planes with variable thickness  and reviewed on dedicated workstations.     CONTRAST: iopamidol (ISOVUE-370) solution 119 mL injected IV with oral  contrast    CT scan radiation dose is optimized to minimum requisite dose using  automated dose modulation techniques.    FINDINGS:    Lower thorax: Normal.    Liver: Smooth liver surface. Mild hepatic steatosis. A 2.1 cm  hyperattenuating lesion matching with with blood pool corresponding to  the known hepatic segment 7 hemangioma . No intrahepatic biliary  ductal dilation.    Biliary System: Normal gallbladder. No extrahepatic biliary ductal  dilation.    Pancreas: No mass or pancreatic ductal dilation.    Adrenal glands: No mass or nodules    Spleen: Normal.    Kidneys: No suspicious mass, obstructing calculus or hydronephrosis.    Gastrointestinal tract :Normal caliber small bowel. Surgical changes  of total proctocolectomy with ileal J pouch anal anastomosis. No  definite mass at anastomotic site.    Mesentery/peritoneum/retroperitoneum: Approximately 4.1 cm omental fat  with rim enhancement and surrounding fat stranding located within left  upper quadrant (series 3:53). No free fluid or air.    Lymph nodes: Several enlarged mesorectal lymph nodes measuring up to 9  mm  in short axis (series 3:139), superior rectal lymph nodes measuring  up to 1 cm (4/343), multiple superior mesenteric lymph nodes measuring  up to 8 mm.     Vasculature: Patent major abdominal vasculature.    Pelvis: Urinary bladder is normal.  Prostate and seminal vesicles are  within normal limits.    Osseous structures: No aggressive or acute osseous lesion.      Soft tissues: Post surgical changes along the anterior abdominal wall.   Fat containing left inguinal hernia.      Impression    IMPRESSION:  1. Approximately 4.1 cm omental fat with rim enhancement and  surrounding fat stranding located within left upper quadrant is  consistent with omental infarction versus epiploic appendagitis,  although the latter is less likely given history of total colectomy.    2. Enlarged mesorectal, superior rectal and mesenteric lymph nodes  measuring up to 9 mm in short axis, are suspicious given history of  malignancy. Consider PET/CT or short term follow up CT in 3-6 months.  3. Surgical changes of total proctocolectomy with ileal J pouch anal  anastomosis. No definite mass at anastomotic site.  4. Additional findings as described above.    I have personally reviewed the examination and initial interpretation  and I agree with the findings.    DERRELL SHEPARD MD         SYSTEM ID:  [id number]   [place] Draw     Status: None    Narrative    The following orders were created for panel order [place] Draw.  Procedure                               Abnormality         Status                     ---------                               -----------         ------                     Extra Blue Top Tube[[id number]]                              Final result               Extra Red Top Tube[[id number]]                               Final result               Extra Green Top (Lithium...[[id number]]                      Final result               Extra Purple Top Tube[[id number]]                            Final result                 Please  view results for these tests on the individual orders.   Extra Blue Top Tube     Status: None   Result Value Ref Range    Hold Specimen JIC    Extra Red Top Tube     Status: None   Result Value Ref Range    Hold Specimen JIC    Extra Green Top (Lithium Heparin) Tube     Status: None   Result Value Ref Range    Hold Specimen JIC    Extra Purple Top Tube     Status: None   Result Value Ref Range    Hold Specimen JIC    CRP inflammation     Status: Normal   Result Value Ref Range    CRP Inflammation 4.49 <5.00 mg/L   INR     Status: Normal   Result Value Ref Range    INR 0.93 0.85 - 1.15   Comprehensive metabolic panel     Status: Normal   Result Value Ref Range    Sodium 139 136 - 145 mmol/L    Potassium 4.6 3.4 - 5.3 mmol/L    Chloride 102 98 - 107 mmol/L    Carbon Dioxide (CO2) 27 22 - 29 mmol/L    Anion Gap 10 7 - 15 mmol/L    Urea Nitrogen 10.8 6.0 - 20.0 mg/dL    Creatinine 0.90 0.67 - 1.17 mg/dL    Calcium 9.7 8.6 - 10.0 mg/dL    Glucose 89 70 - 99 mg/dL    Alkaline Phosphatase 104 40 - 129 U/L    AST 27 10 - 50 U/L    ALT 45 10 - 50 U/L    Protein Total 7.8 6.4 - 8.3 g/dL    Albumin 4.8 3.5 - 5.2 g/dL    Bilirubin Total 0.5 <=1.2 mg/dL    GFR Estimate >90 >60 mL/min/1.73m2   Lipase     Status: Normal   Result Value Ref Range    Lipase 22 13 - 60 U/L   UA with Microscopic reflex to Culture     Status: Abnormal    Specimen: Urine, Clean Catch   Result Value Ref Range    Color Urine Light Yellow Colorless, Straw, Light Yellow, Yellow    Appearance Urine Clear Clear    Glucose Urine Negative Negative mg/dL    Bilirubin Urine Negative Negative    Ketones Urine Negative Negative mg/dL    Specific Gravity Urine 1.016 1.003 - 1.035    Blood Urine Negative Negative    pH Urine 5.0 5.0 - 7.0    Protein Albumin Urine Negative Negative mg/dL    Urobilinogen Urine Normal Normal, 2.0 mg/dL    Nitrite Urine Negative Negative    Leukocyte Esterase Urine Negative Negative    Mucus Urine Present (A) None Seen /LPF    RBC Urine 0  <=2 /HPF    WBC Urine <1 <=5 /HPF    Narrative    Urine Culture not indicated   CBC with platelets and differential     Status: None   Result Value Ref Range    WBC Count 8.7 4.0 - 11.0 10e3/uL    RBC Count 5.22 4.40 - 5.90 10e6/uL    Hemoglobin 15.8 13.3 - 17.7 g/dL    Hematocrit 46.5 40.0 - 53.0 %    MCV 89 78 - 100 fL    MCH 30.3 26.5 - 33.0 pg    MCHC 34.0 31.5 - 36.5 g/dL    RDW 13.2 10.0 - 15.0 %    Platelet Count 316 150 - 450 10e3/uL    % Neutrophils 64 %    % Lymphocytes 27 %    % Monocytes 6 %    % Eosinophils 2 %    % Basophils 1 %    % Immature Granulocytes 0 %    NRBCs per 100 WBC 0 <1 /100    Absolute Neutrophils 5.5 1.6 - 8.3 10e3/uL    Absolute Lymphocytes 2.4 0.8 - 5.3 10e3/uL    Absolute Monocytes 0.5 0.0 - 1.3 10e3/uL    Absolute Eosinophils 0.2 0.0 - 0.7 10e3/uL    Absolute Basophils 0.1 0.0 - 0.2 10e3/uL    Absolute Immature Granulocytes 0.0 <=0.4 10e3/uL    Absolute NRBCs 0.0 10e3/uL   CBC with platelets differential     Status: None    Narrative    The following orders were created for panel order CBC with platelets differential.  Procedure                               Abnormality         Status                     ---------                               -----------         ------                     CBC with platelets and d...[[id number]]                      Final result                 Please view results for these tests on the individual orders.     Medications   0.9% sodium chloride BOLUS (0 mLs Intravenous Stopped 9/19/22 1132)   iopamidol (ISOVUE-370) solution 119 mL (119 mLs Intravenous Given 9/19/22 1355)   sodium chloride (PF) 0.9% PF flush 80 mL (80 mLs Intravenous Given 9/19/22 1355)   acetaminophen (TYLENOL) tablet 1,000 mg (1,000 mg Oral Given 9/19/22 1636)        Assessments & Plan (with Medical Decision Making)  32-year-old male history of colon cancer with total colectomy done 11/2020 at [facility] is gone through chemoradiation etc. has been cancer free.  Patient had noted 3 weeks  ago start developing some right lower quadrant pain.  Was seen 2 weeks ago with a CT scan done at a radiology facility.  Findings revealed epiploic appendagitis.  Over the last week he has been taking some ibuprofen he is on Eliquis for history of PE in the past but no reports of GI bleeding.  Patient noted some increasing pain through the night now is improved presents here to the ER.  Vitally stable afebrile abdomen nonsurgical labs otherwise stable with symptoms previous CT was without contrast we did it with contrast findings show some omental infarct less likely epiploic appendagitis also some lymph nodes in the perirectal area.  Recommendations after surgery did see the patient with consultation where no surgical interventions needed at this point symptomatic treatment has been doing patient should follow-up with his colorectal surgeon or care team at [facility].  Patient given copy of results understands plan and follow-up etc.  We will do so.  Stated that patient's could have results images etc. sent to them and they can review these as they are in epic also.       I have reviewed the nursing notes. I have reviewed the findings, diagnosis, plan and need for follow up with the patient.    Discharge Medication List as of 9/19/2022  5:16 PM          Final diagnoses:   Omental infarction (H)   Abdominal pain, generalized   History of colon cancer   Long term current use of anticoagulant therapy       --  Bang Diaz  [facility] EMERGENCY DEPARTMENT  9/19/2022    This note was created at least in part by the use of dragon voice dictation system. Inadvertent typographical errors may still exist.  Bang Diaz MD.    Patient evaluated in the emergency department during the COVID-19 pandemic period. Careful attention to patients safety was addressed throughout the evaluation. Evaluation and treatment management was initiated with disposition made efficiently and appropriate as possible to minimize any  risk of potential exposure to patient during this evaluation.       Bang Diaz MD  09/19/22 2032

## 2022-09-21 ENCOUNTER — LAB REQUISITION (OUTPATIENT)
Dept: LAB | Facility: CLINIC | Age: 32
End: 2022-09-21
Payer: COMMERCIAL

## 2022-09-21 DIAGNOSIS — Z13.220 ENCOUNTER FOR SCREENING FOR LIPOID DISORDERS: ICD-10-CM

## 2022-09-21 LAB
CHOLEST SERPL-MCNC: 187 MG/DL
HDLC SERPL-MCNC: 63 MG/DL
LDLC SERPL CALC-MCNC: 94 MG/DL
NONHDLC SERPL-MCNC: 124 MG/DL
TRIGL SERPL-MCNC: 150 MG/DL

## 2022-09-21 PROCEDURE — 80061 LIPID PANEL: CPT | Mod: ORL | Performed by: PHYSICIAN ASSISTANT

## 2023-01-07 ENCOUNTER — HEALTH MAINTENANCE LETTER (OUTPATIENT)
Age: 33
End: 2023-01-07

## 2023-04-22 ENCOUNTER — HEALTH MAINTENANCE LETTER (OUTPATIENT)
Age: 33
End: 2023-04-22

## 2023-12-20 ENCOUNTER — LAB REQUISITION (OUTPATIENT)
Dept: LAB | Facility: CLINIC | Age: 33
End: 2023-12-20
Payer: COMMERCIAL

## 2023-12-20 PROCEDURE — 87081 CULTURE SCREEN ONLY: CPT | Mod: ORL | Performed by: NURSE PRACTITIONER

## 2023-12-22 LAB — BACTERIA SPEC CULT: NORMAL

## 2024-06-23 ENCOUNTER — HEALTH MAINTENANCE LETTER (OUTPATIENT)
Age: 34
End: 2024-06-23

## 2025-07-12 ENCOUNTER — HEALTH MAINTENANCE LETTER (OUTPATIENT)
Age: 35
End: 2025-07-12